# Patient Record
Sex: FEMALE | Race: WHITE | NOT HISPANIC OR LATINO
[De-identification: names, ages, dates, MRNs, and addresses within clinical notes are randomized per-mention and may not be internally consistent; named-entity substitution may affect disease eponyms.]

---

## 2021-05-12 PROBLEM — Z00.00 ENCOUNTER FOR PREVENTIVE HEALTH EXAMINATION: Status: ACTIVE | Noted: 2021-05-12

## 2021-05-24 PROBLEM — Z87.19 HISTORY OF SMALL BOWEL OBSTRUCTION: Status: RESOLVED | Noted: 2021-05-24 | Resolved: 2021-05-24

## 2021-05-24 PROBLEM — Z87.2 HISTORY OF ROSACEA: Status: RESOLVED | Noted: 2021-05-24 | Resolved: 2021-05-24

## 2021-05-24 PROBLEM — Z80.3 FAMILY HISTORY OF MALIGNANT NEOPLASM OF BREAST: Status: ACTIVE | Noted: 2021-05-24

## 2021-05-24 PROBLEM — Z86.39 HISTORY OF THYROID DISORDER: Status: RESOLVED | Noted: 2021-05-24 | Resolved: 2021-05-24

## 2021-05-24 PROBLEM — Z87.09 HISTORY OF ASTHMA: Status: RESOLVED | Noted: 2021-05-24 | Resolved: 2021-05-24

## 2021-05-27 ENCOUNTER — APPOINTMENT (OUTPATIENT)
Dept: RADIATION ONCOLOGY | Facility: CLINIC | Age: 61
End: 2021-05-27
Payer: COMMERCIAL

## 2021-05-27 VITALS
BODY MASS INDEX: 25.61 KG/M2 | HEIGHT: 64 IN | SYSTOLIC BLOOD PRESSURE: 148 MMHG | OXYGEN SATURATION: 99 % | TEMPERATURE: 98 F | DIASTOLIC BLOOD PRESSURE: 74 MMHG | HEART RATE: 82 BPM | WEIGHT: 150 LBS | RESPIRATION RATE: 12 BRPM

## 2021-05-27 DIAGNOSIS — Z86.39 PERSONAL HISTORY OF OTHER ENDOCRINE, NUTRITIONAL AND METABOLIC DISEASE: ICD-10-CM

## 2021-05-27 DIAGNOSIS — Z80.8 FAMILY HISTORY OF MALIGNANT NEOPLASM OF OTHER ORGANS OR SYSTEMS: ICD-10-CM

## 2021-05-27 DIAGNOSIS — Z87.09 PERSONAL HISTORY OF OTHER DISEASES OF THE RESPIRATORY SYSTEM: ICD-10-CM

## 2021-05-27 DIAGNOSIS — Z87.19 PERSONAL HISTORY OF OTHER DISEASES OF THE DIGESTIVE SYSTEM: ICD-10-CM

## 2021-05-27 DIAGNOSIS — Z80.3 FAMILY HISTORY OF MALIGNANT NEOPLASM OF BREAST: ICD-10-CM

## 2021-05-27 DIAGNOSIS — Z87.2 PERSONAL HISTORY OF DISEASES OF THE SKIN AND SUBCUTANEOUS TISSUE: ICD-10-CM

## 2021-05-27 PROCEDURE — 99204 OFFICE O/P NEW MOD 45 MIN: CPT

## 2021-05-27 PROCEDURE — 99072 ADDL SUPL MATRL&STAF TM PHE: CPT

## 2021-05-27 NOTE — VITALS
[Maximal Pain Intensity: 0/10] : 0/10 [Least Pain Intensity: 0/10] : 0/10 [90: Able to carry normal activity; minor signs or symptoms of disease.] : 90: Able to carry normal activity; minor signs or symptoms of disease.  [90: Minor restrictions in physically strenous activity.] : 90: Minor restrictions in physically strenuous activity. [ECOG Performance Status: 1 - Restricted in physically strenuous activity but ambulatory and able to carry out work of a light or sedentary nature] : Performance Status: 1 - Restricted in physically strenuous activity but ambulatory and able to carry out work of a light or sedentary nature, e.g., light house work, office work [Date: ____________] : Patient's last distress assessment performed on [unfilled]. [0 - No Distress] : Distress Level: 0

## 2021-05-27 NOTE — PHYSICAL EXAM
[Normal External Genitalia] : normal external genitalia  [Normal] : oriented to person, place and time, the affect was normal, the mood was normal and not anxious [FreeTextEntry1] : No pelvic mass palpable.  No blood on the examining finger [de-identified] : Vagina narrow.  No implants noted on the vaginal wall.  No pelvic mass palpable.  No blood on the examining finger

## 2021-05-27 NOTE — REVIEW OF SYSTEMS
[Anal Pain: Grade 0] : Anal Pain: Grade 0 [Constipation: Grade 0] : Constipation: Grade 0 [Diarrhea: Grade 0] : Diarrhea: Grade 0 [Nausea: Grade 0] : Nausea: Grade 0 [Vomiting: Grade 0] : Vomiting: Grade 0 [Fatigue: Grade 1 - Fatigue relieved by rest] : Fatigue: Grade 1 - Fatigue relieved by rest [Hematuria: Grade 0] : Hematuria: Grade 0 [Urinary Incontinence: Grade 0] : Urinary Incontinence: Grade 0  [Anxiety: Grade 0] : Anxiety: Grade 0  [Depression: Grade 0] : Depression: Grade 0 [Insomnia: Grade 0] : Insomnia: Grade 0

## 2021-05-27 NOTE — HISTORY OF PRESENT ILLNESS
[FreeTextEntry1] : Mrs. Alva Jarvis presents today for recurrent ovarian cancer referred by Dr. Anderson. \par \par Mrs. 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 She had a TAHBSO at that time. Then in 2016 she had a second debulking procedure including a sigmoid resection with reanastomosis and resection of multiple implants in 4/2016. 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  She then completed 6 cycles of Carboplatin and Doxil. Then had a post treatment CT Scan which revealed a question of a calcified left pelvic sidewall mass. She then went to [facility] for another opinion and had a tertiary debulking with Dr. Samaniego. \par \par In 2018 she had a subacute intestinal obstruction, which she was admitted for 2 days for here at [facility]. \par \par 2021 She has been receiving Topotecan infusions. She has been experiencing symptomatic anemia and has been receiving Aranesp and B12 injections. In early May she  experienced an episode of right sided abdominal pain which radiated down to her leg and the buttock. It eventually passed. Her last infusion was 4/2021. She had a blood transfusion on 5/10/2021.\par \par \par On 5/3/2021 CT C/A/P revealed Mesenteric lymphadenopathy measuring 1.4 x 1.0 cm without significant change. Adjacent inferior mesenteric lymph node 5 mm previously 10 mm.  Right internal iliac lymph jaquan mass or implant is difficult to measure as there is contiguous adjacent infiltration versus wall thickening of the adjacent small bowel loop which may result in inaccurate measurements on current and previous exam but more conspicuous involvement of the bowel on current study with small intraluminal air within, measures about 3.6 x 3.3 which includes of the bowel and about 3.4 x 2.1 cm attempting to exclude bowel and previously measured about 3.2 x 3.0 cm. Suspect slight increased size with increased linear contiguous soft tissue thickening. No evidence of proximal bowel dilation or bowel obstruction. Mild presacral haziness is stable. Mild left sided pelvic sidewall fascial thickening with adjacent surgical clips are stable and likely represent post surgical changes. \par \par She denies any pain at this time, she denies any bowel or bladder issues, denies any bleeding. Abdomen is nontender, nondistended, no dependent edema noted. She is eating, sleeping, drinking well.

## 2021-06-22 ENCOUNTER — NON-APPOINTMENT (OUTPATIENT)
Age: 61
End: 2021-06-22

## 2021-06-22 VITALS
TEMPERATURE: 98 F | OXYGEN SATURATION: 98 % | SYSTOLIC BLOOD PRESSURE: 138 MMHG | HEART RATE: 84 BPM | DIASTOLIC BLOOD PRESSURE: 78 MMHG | RESPIRATION RATE: 14 BRPM

## 2021-06-22 NOTE — DISEASE MANAGEMENT
[Pathological] : TNM Stage: p [III] : III [TTNM] : x [NTNM] : x [MTNM] : x [de-identified] : completed 5/30 fractions to a dose of 900 cGy

## 2021-06-22 NOTE — HISTORY OF PRESENT ILLNESS
[FreeTextEntry1] : Mrs. Alva Jarvis presents today for recurrent ovarian cancer referred by Dr. Anderson. \par \par Mrs. 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 She had a TAHBSO at that time. Then in 2016 she had a second debulking procedure including a sigmoid resection with reanastomosis and resection of multiple implants in 4/2016. 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  She then completed 6 cycles of Carboplatin and Doxil. Then had a post treatment CT Scan which revealed a question of a calcified left pelvic sidewall mass. She then went to [facility] for another opinion and had a tertiary debulking with Dr. Samaniego. \par \par In 2018 she had a subacute intestinal obstruction, which she was admitted for 2 days for here at [facility]. \par \par 2021 She has been receiving Topotecan infusions. She has been experiencing symptomatic anemia and has been receiving Aranesp and B12 injections. In early May she  experienced an episode of right sided abdominal pain which radiated down to her leg and the buttock. It eventually passed. Her last infusion was 4/2021. She had a blood transfusion on 5/10/2021.\par \par \par On 5/3/2021 CT C/A/P revealed Mesenteric lymphadenopathy measuring 1.4 x 1.0 cm without significant change. Adjacent inferior mesenteric lymph node 5 mm previously 10 mm.  Right internal iliac lymph jaquan mass or implant is difficult to measure as there is contiguous adjacent infiltration versus wall thickening of the adjacent small bowel loop which may result in inaccurate measurements on current and previous exam but more conspicuous involvement of the bowel on current study with small intraluminal air within, measures about 3.6 x 3.3 which includes of the bowel and about 3.4 x 2.1 cm attempting to exclude bowel and previously measured about 3.2 x 3.0 cm. Suspect slight increased size with increased linear contiguous soft tissue thickening. No evidence of proximal bowel dilation or bowel obstruction. Mild presacral haziness is stable. Mild left sided pelvic sidewall fascial thickening with adjacent surgical clips are stable and likely represent post surgical changes. \par \par She denies any pain at this time, she denies any bowel or bladder issues, denies any bleeding. Abdomen is nontender, nondistended, no dependent edema noted. She is eating, sleeping, drinking well.

## 2021-06-22 NOTE — REVIEW OF SYSTEMS
[Constipation: Grade 0] : Constipation: Grade 0 [Diarrhea: Grade 0] : Diarrhea: Grade 0 [Urinary Urgency: Grade 1 - Present] : Urinary Urgency: Grade 1 - Present [Urinary Frequency: Grade 1 - Present] : Urinary Frequency: Grade 1 - Present [Dermatitis Radiation: Grade 0] : Dermatitis Radiation: Grade 0 [FreeTextEntry6] : Eucerin BID

## 2021-07-09 ENCOUNTER — NON-APPOINTMENT (OUTPATIENT)
Age: 61
End: 2021-07-09

## 2021-07-09 NOTE — DISEASE MANAGEMENT
[Pathological] : TNM Stage: p [III] : III [TTNM] : x [NTNM] : x [MTNM] : x [de-identified] : completed 11 / 30 fractions to a dose of 1980 cGy

## 2021-07-09 NOTE — REVIEW OF SYSTEMS
[Anal Pain: Grade 0] : Anal Pain: Grade 0 [Constipation: Grade 0] : Constipation: Grade 0 [Diarrhea: Grade 0] : Diarrhea: Grade 0 [Dyspepsia: Grade 0] : Dyspepsia: Grade 0 [Dysphagia: Grade 0] : Dysphagia: Grade 0 [Esophagitis: Grade 0] : Esophagitis: Grade 0 [Fecal Incontinence: Grade 0] : Fecal Incontinence: Grade 0 [Gastroparesis: Grade 0] : Gastroparesis: Grade 0 [Nausea: Grade 0] : Nausea: Grade 0 [Proctitis: Grade 0] : Proctitis: Grade 0 [Rectal Pain: Grade 0] : Rectal Pain: Grade 0 [Small Intestinal Obstruction: Grade 0] : Small Intestinal Obstruction: Grade 0 [Vomiting: Grade 0] : Vomiting: Grade 0 [Fatigue: Grade 1 - Fatigue relieved by rest] : Fatigue: Grade 1 - Fatigue relieved by rest [Urinary Urgency: Grade 1 - Present] : Urinary Urgency: Grade 1 - Present [Urinary Frequency: Grade 1 - Present] : Urinary Frequency: Grade 1 - Present [Alopecia: Grade 0] : Alopecia: Grade 0 [Pruritus: Grade 0] : Pruritus: Grade 0 [Skin Atrophy: Grade 0] : Skin Atrophy: Grade 0 [Skin Hyperpigmentation: Grade 0] : Skin Hyperpigmentation: Grade 0 [Dermatitis Radiation: Grade 0] : Dermatitis Radiation: Grade 0 [FreeTextEntry6] : Eucerin BID

## 2021-07-09 NOTE — VITALS
[Maximal Pain Intensity: 0/10] : 0/10 [Least Pain Intensity: 0/10] : 0/10 [90: Able to carry normal activity; minor signs or symptoms of disease.] : 90: Able to carry normal activity; minor signs or symptoms of disease.  [90: Minor restrictions in physically strenous activity.] : 90: Minor restrictions in physically strenuous activity. [ECOG Performance Status: 0 - Fully active, able to carry on all pre-disease performance without restriction] : Performance Status: 0 - Fully active, able to carry on all pre-disease performance without restriction [Date: ____________] : Patient's last distress assessment performed on [unfilled]. [0 - No Distress] : Distress Level: 0

## 2021-07-09 NOTE — HISTORY OF PRESENT ILLNESS
[FreeTextEntry1] : Mrs. Alva Jarvis is a 61 year old female with Locally advanced high-grade papillary serous ovarian cancer, status post surgical resection and systemic chemotherapy with isolated relapse in pelvic region.\par \par She is here for an OTV and has completed 11 / 30 fractions to a dose of 1980 cGy.\par \par

## 2021-07-14 ENCOUNTER — NON-APPOINTMENT (OUTPATIENT)
Age: 61
End: 2021-07-14

## 2021-07-14 VITALS
OXYGEN SATURATION: 99 % | DIASTOLIC BLOOD PRESSURE: 80 MMHG | SYSTOLIC BLOOD PRESSURE: 136 MMHG | HEART RATE: 78 BPM | RESPIRATION RATE: 14 BRPM | TEMPERATURE: 98 F

## 2021-07-15 NOTE — DISEASE MANAGEMENT
[Pathological] : TNM Stage: p [III] : III [TTNM] : x [NTNM] : x [MTNM] : x [de-identified] : completed 15 / 30 fractions to a dose of 2700 cGy

## 2021-07-15 NOTE — HISTORY OF PRESENT ILLNESS
[FreeTextEntry1] : Mrs. Alva Jarvis is a 61 year old female with Locally advanced high-grade papillary serous ovarian cancer, status post surgical resection and systemic chemotherapy with isolated relapse in pelvic region.\par \par She is here for an OTV and notes that she is doing well. She complains of afternoon episodes where she develops chills but they resolve after time.\par \par

## 2021-07-20 ENCOUNTER — NON-APPOINTMENT (OUTPATIENT)
Age: 61
End: 2021-07-20

## 2021-07-20 VITALS
WEIGHT: 147 LBS | DIASTOLIC BLOOD PRESSURE: 64 MMHG | RESPIRATION RATE: 12 BRPM | TEMPERATURE: 98 F | SYSTOLIC BLOOD PRESSURE: 123 MMHG | OXYGEN SATURATION: 98 % | BODY MASS INDEX: 25.1 KG/M2 | HEART RATE: 67 BPM | HEIGHT: 64 IN

## 2021-07-20 NOTE — DISEASE MANAGEMENT
[Pathological] : TNM Stage: p [III] : III [TTNM] : x [NTNM] : x [MTNM] : x [de-identified] : completed 19 / 30 fractions to a dose of 3420 cGy

## 2021-07-20 NOTE — HISTORY OF PRESENT ILLNESS
[FreeTextEntry1] : Mrs. Alva Jarvis is a 61 year old female with Locally advanced high-grade papillary serous ovarian cancer, status post surgical resection and systemic chemotherapy with isolated relapse in pelvic region.\par \par She is here for an OTV and notes that she is doing well. She has no complaints today. She denies pain, bleeding, urinary issues, or bowel issues. \par

## 2021-07-20 NOTE — REVIEW OF SYSTEMS
[Anal Pain: Grade 0] : Anal Pain: Grade 0 [Constipation: Grade 0] : Constipation: Grade 0 [Diarrhea: Grade 0] : Diarrhea: Grade 0 [Dyspepsia: Grade 0] : Dyspepsia: Grade 0 [Esophagitis: Grade 0] : Esophagitis: Grade 0 [Dysphagia: Grade 0] : Dysphagia: Grade 0 [Fecal Incontinence: Grade 0] : Fecal Incontinence: Grade 0 [Gastroparesis: Grade 0] : Gastroparesis: Grade 0 [Nausea: Grade 0] : Nausea: Grade 0 [Proctitis: Grade 0] : Proctitis: Grade 0 [Rectal Pain: Grade 0] : Rectal Pain: Grade 0 [Small Intestinal Obstruction: Grade 0] : Small Intestinal Obstruction: Grade 0 [Vomiting: Grade 0] : Vomiting: Grade 0 [Fatigue: Grade 1 - Fatigue relieved by rest] : Fatigue: Grade 1 - Fatigue relieved by rest [Hematuria: Grade 0] : Hematuria: Grade 0 [Urinary Incontinence: Grade 0] : Urinary Incontinence: Grade 0  [Urinary Retention: Grade 0] : Urinary Retention: Grade 0 [Urinary Tract Pain: Grade 0] : Urinary Tract Pain: Grade 0 [Urinary Urgency: Grade 0] : Urinary Urgency: Grade 0 [Urinary Frequency: Grade 0] : Urinary Frequency: Grade 0 [Alopecia: Grade 0] : Alopecia: Grade 0 [Pruritus: Grade 0] : Pruritus: Grade 0 [Skin Atrophy: Grade 0] : Skin Atrophy: Grade 0 [Skin Hyperpigmentation: Grade 0] : Skin Hyperpigmentation: Grade 0 [Skin Induration: Grade 0] : Skin Induration: Grade 0 [Dermatitis Radiation: Grade 0] : Dermatitis Radiation: Grade 0 [FreeTextEntry6] : Eucerin BID

## 2021-07-27 ENCOUNTER — NON-APPOINTMENT (OUTPATIENT)
Age: 61
End: 2021-07-27

## 2021-07-27 VITALS
TEMPERATURE: 98 F | OXYGEN SATURATION: 98 % | DIASTOLIC BLOOD PRESSURE: 77 MMHG | HEIGHT: 64 IN | BODY MASS INDEX: 24.75 KG/M2 | WEIGHT: 145 LBS | SYSTOLIC BLOOD PRESSURE: 135 MMHG | RESPIRATION RATE: 13 BRPM | HEART RATE: 66 BPM

## 2021-07-27 NOTE — REVIEW OF SYSTEMS
[Anal Pain: Grade 0] : Anal Pain: Grade 0 [Constipation: Grade 0] : Constipation: Grade 0 [Diarrhea: Grade 0] : Diarrhea: Grade 0 [Dyspepsia: Grade 0] : Dyspepsia: Grade 0 [Dysphagia: Grade 0] : Dysphagia: Grade 0 [Esophagitis: Grade 0] : Esophagitis: Grade 0 [Fecal Incontinence: Grade 0] : Fecal Incontinence: Grade 0 [Gastroparesis: Grade 0] : Gastroparesis: Grade 0 [Nausea: Grade 0] : Nausea: Grade 0 [Proctitis: Grade 0] : Proctitis: Grade 0 [Rectal Pain: Grade 0] : Rectal Pain: Grade 0 [Small Intestinal Obstruction: Grade 0] : Small Intestinal Obstruction: Grade 0 [Vomiting: Grade 0] : Vomiting: Grade 0 [Fatigue: Grade 0] : Fatigue: Grade 0 [Hematuria: Grade 0] : Hematuria: Grade 0 [Urinary Incontinence: Grade 0] : Urinary Incontinence: Grade 0  [Urinary Retention: Grade 0] : Urinary Retention: Grade 0 [Urinary Tract Pain: Grade 0] : Urinary Tract Pain: Grade 0 [Urinary Urgency: Grade 0] : Urinary Urgency: Grade 0 [Urinary Frequency: Grade 0] : Urinary Frequency: Grade 0 [Alopecia: Grade 0] : Alopecia: Grade 0 [Pruritus: Grade 0] : Pruritus: Grade 0 [Skin Atrophy: Grade 0] : Skin Atrophy: Grade 0 [Skin Hyperpigmentation: Grade 0] : Skin Hyperpigmentation: Grade 0 [Skin Induration: Grade 0] : Skin Induration: Grade 0 [Dermatitis Radiation: Grade 0] : Dermatitis Radiation: Grade 0 [FreeTextEntry6] : Eucerin BID

## 2021-07-27 NOTE — HISTORY OF PRESENT ILLNESS
[FreeTextEntry1] : Mrs. Alva Jarvis is a 61 year old female with Locally advanced high-grade papillary serous ovarian cancer, status post surgical resection and systemic chemotherapy with isolated relapse in pelvic region.\par \par She is here for an OTV and notes that she is doing well. She has no complaints today. She denies pain, bleeding, urinary issues, or bowel issues. She is using Eucerin cream BID. \par

## 2021-07-27 NOTE — DISEASE MANAGEMENT
[Pathological] : TNM Stage: p [III] : III [TTNM] : x [NTNM] : x [MTNM] : x [de-identified] : completed 23 / 30 fractions to a dose of 4140cGy

## 2021-08-03 ENCOUNTER — NON-APPOINTMENT (OUTPATIENT)
Age: 61
End: 2021-08-03

## 2021-08-03 VITALS
TEMPERATURE: 98 F | WEIGHT: 143 LBS | HEART RATE: 73 BPM | RESPIRATION RATE: 12 BRPM | DIASTOLIC BLOOD PRESSURE: 82 MMHG | BODY MASS INDEX: 24.41 KG/M2 | HEIGHT: 64 IN | SYSTOLIC BLOOD PRESSURE: 148 MMHG | OXYGEN SATURATION: 99 %

## 2021-08-03 NOTE — REVIEW OF SYSTEMS
[Anal Pain: Grade 0] : Anal Pain: Grade 0 [Constipation: Grade 0] : Constipation: Grade 0 [Diarrhea: Grade 0] : Diarrhea: Grade 0 [Dyspepsia: Grade 0] : Dyspepsia: Grade 0 [Dysphagia: Grade 0] : Dysphagia: Grade 0 [Esophagitis: Grade 0] : Esophagitis: Grade 0 [Fecal Incontinence: Grade 0] : Fecal Incontinence: Grade 0 [Gastroparesis: Grade 0] : Gastroparesis: Grade 0 [Nausea: Grade 0] : Nausea: Grade 0 [Proctitis: Grade 0] : Proctitis: Grade 0 [Rectal Pain: Grade 0] : Rectal Pain: Grade 0 [Small Intestinal Obstruction: Grade 0] : Small Intestinal Obstruction: Grade 0 [Vomiting: Grade 0] : Vomiting: Grade 0 [Fatigue: Grade 0] : Fatigue: Grade 0 [Hematuria: Grade 0] : Hematuria: Grade 0 [Urinary Incontinence: Grade 0] : Urinary Incontinence: Grade 0  [Urinary Retention: Grade 0] : Urinary Retention: Grade 0 [Urinary Tract Pain: Grade 0] : Urinary Tract Pain: Grade 0 [Urinary Urgency: Grade 0] : Urinary Urgency: Grade 0 [Urinary Frequency: Grade 0] : Urinary Frequency: Grade 0 [Breast Pain: Grade 0] : Breast Pain: Grade 0 [Alopecia: Grade 0] : Alopecia: Grade 0 [Pruritus: Grade 0] : Pruritus: Grade 0 [Skin Atrophy: Grade 0] : Skin Atrophy: Grade 0 [Skin Hyperpigmentation: Grade 0] : Skin Hyperpigmentation: Grade 0 [Skin Induration: Grade 0] : Skin Induration: Grade 0 [Dermatitis Radiation: Grade 0] : Dermatitis Radiation: Grade 0 [FreeTextEntry6] : Eucerin BID

## 2021-08-03 NOTE — HISTORY OF PRESENT ILLNESS
[FreeTextEntry1] : Mrs. Alva Jarvis is a 61 year old female with Locally advanced high-grade papillary serous ovarian cancer, status post surgical resection and systemic chemotherapy with isolated relapse in pelvic region.\par \par She is here for an OTV and has completed 28 / 30 fractions to a dose of 5040cGy She has no complaints today. She denies pain, bleeding, urinary issues, or bowel issues. She is using Eucerin cream BID. \par

## 2021-08-03 NOTE — DISEASE MANAGEMENT
[Pathological] : TNM Stage: p [III] : III [TTNM] : x [NTNM] : x [MTNM] : x [de-identified] : completed 28 / 30 fractions to a dose of 5040cGy

## 2021-09-13 ENCOUNTER — APPOINTMENT (OUTPATIENT)
Dept: RADIATION ONCOLOGY | Facility: CLINIC | Age: 61
End: 2021-09-13
Payer: COMMERCIAL

## 2021-09-13 VITALS
DIASTOLIC BLOOD PRESSURE: 69 MMHG | RESPIRATION RATE: 12 BRPM | SYSTOLIC BLOOD PRESSURE: 121 MMHG | BODY MASS INDEX: 23.56 KG/M2 | TEMPERATURE: 98.7 F | HEIGHT: 64 IN | OXYGEN SATURATION: 97 % | WEIGHT: 138 LBS | HEART RATE: 67 BPM

## 2021-09-13 PROCEDURE — 99024 POSTOP FOLLOW-UP VISIT: CPT

## 2021-09-13 NOTE — VITALS
[Maximal Pain Intensity: 2/10] : 2/10 [Pain Duration: ___] : Pain duration: [unfilled] [Pain Location: ___] : Pain Location: [unfilled] [NSAID/Non-Opioid] : NSAID/Non-Opioid [80: Normal activity with effort; some signs or symptoms of disease.] : 80: Normal activity with effort; some signs or symptoms of disease.  [ECOG Performance Status: 1 - Restricted in physically strenuous activity but ambulatory and able to carry out work of a light or sedentary nature] : Performance Status: 1 - Restricted in physically strenuous activity but ambulatory and able to carry out work of a light or sedentary nature, e.g., light house work, office work

## 2021-09-13 NOTE — HISTORY OF PRESENT ILLNESS
[FreeTextEntry1] : Mrs. Alva Jarvis presents today for recurrent ovarian cancer referred by Dr. Anderson. \par \par Mrs. 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 She had a TAHBSO at that time. Then in 2016 she had a second debulking procedure including a sigmoid resection with reanastomosis and resection of multiple implants in 4/2016. 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 She then completed 6 cycles of Carboplatin and Doxil. Then had a post treatment CT Scan which revealed a question of a calcified left pelvic sidewall mass. She then went [place] [facility] [place] and had a tertiary debulking with Dr. Samaniego. \par \par In 2018 she had a subacute intestinal obstruction, which she was admitted for 2 days for here at [facility]. \par \par 2021 She has been receiving Topotecan infusions. She has been experiencing symptomatic anemia and has been receiving Aranesp and B12 injections. In early May she experienced an episode of right sided abdominal pain which radiated down to her leg and the buttock. It eventually passed. Her last infusion was 4/2021. She had a blood transfusion on 5/10/2021.\par \par On 5/3/2021 CT C/A/P revealed Mesenteric lymphadenopathy measuring 1.4 x 1.0 cm without significant change. Adjacent inferior mesenteric lymph node 5 mm previously 10 mm. Right internal iliac lymph jaquan mass or implant is difficult to measure as there is contiguous adjacent infiltration versus wall thickening of the adjacent small bowel loop which may result in inaccurate measurements on current and previous exam but more conspicuous involvement of the bowel on current study with small intraluminal air within, measures about 3.6 x 3.3 which includes of the bowel and about 3.4 x 2.1 cm attempting to exclude bowel and previously measured about 3.2 x 3.0 cm. Suspect slight increased size with increased linear contiguous soft tissue thickening. No evidence of proximal bowel dilation or bowel obstruction. Mild presacral haziness is stable. Mild left sided pelvic sidewall fascial thickening with adjacent surgical clips are stable and likely represent post surgical changes. \par \par \par 9/13/21\par She is here for a PTE after completing 30 of 30 fractions to a dose of 5400cGy on 8/5/21. She states that overall she is feeling well. She has a good appetite. She had a PET/CT ([facility]) on 9/7/2021 that revealed hypermetabolic left lower neck retroclavicular lymphadenopathy new. Right posterior inferior deep chest wall region mass new. Lobulated small bowel mesenteric lesion new since previous PET/CT and ill defined posterior intraperitoneal lesion with suggested engulfment of a small bowel loop increased since previous PET/CT \par She began chemotherapy with Topotecan today. She does have pain that radiates around the back.

## 2021-09-13 NOTE — DISEASE MANAGEMENT
[Pathological] : TNM Stage: p [III] : III [TTNM] : x [NTNM] : x [MTNM] : x [de-identified] : completed 30 / 30 fractions to a dose of 5400cGy on 8/5/21

## 2021-10-12 ENCOUNTER — APPOINTMENT (OUTPATIENT)
Dept: RADIATION ONCOLOGY | Facility: CLINIC | Age: 61
End: 2021-10-12
Payer: COMMERCIAL

## 2021-10-12 VITALS
BODY MASS INDEX: 22.36 KG/M2 | DIASTOLIC BLOOD PRESSURE: 62 MMHG | OXYGEN SATURATION: 100 % | SYSTOLIC BLOOD PRESSURE: 103 MMHG | HEART RATE: 90 BPM | RESPIRATION RATE: 14 BRPM | WEIGHT: 131 LBS | HEIGHT: 64 IN | TEMPERATURE: 99.3 F

## 2021-10-12 VITALS — SYSTOLIC BLOOD PRESSURE: 103 MMHG | DIASTOLIC BLOOD PRESSURE: 62 MMHG

## 2021-10-12 PROCEDURE — 99213 OFFICE O/P EST LOW 20 MIN: CPT | Mod: 25

## 2021-10-12 NOTE — HISTORY OF PRESENT ILLNESS
[FreeTextEntry1] : Mrs. Alva Jarvis presents today for recurrent ovarian cancer referred by Dr. Anderson. \par \par Mrs. 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 She had a TAHBSO at that time. Then in 2016 she had a second debulking procedure including a sigmoid resection with reanastomosis and resection of multiple implants in 4/2016. 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 She then completed 6 cycles of Carboplatin and Doxil. Then had a post treatment CT Scan which revealed a question of a calcified left pelvic sidewall mass. She then went to [facility] [place] and had a tertiary debulking with Dr. Samaniego. \par \par In 2018 she had a subacute intestinal obstruction, which she was admitted for 2 days for here at [facility]. \par \par 2021 She has been receiving Topotecan infusions. She has been experiencing symptomatic anemia and has been receiving Aranesp and B12 injections. In early May she experienced an episode of right sided abdominal pain which radiated down to her leg and the buttock. It eventually passed. Her last infusion was 4/2021. She had a blood transfusion on 5/10/2021.\par \par On 5/3/2021 CT C/A/P revealed Mesenteric lymphadenopathy measuring 1.4 x 1.0 cm without significant change. Adjacent inferior mesenteric lymph node 5 mm previously 10 mm. Right internal iliac lymph jaquan mass or implant is difficult to measure as there is contiguous adjacent infiltration versus wall thickening of the adjacent small bowel loop which may result in inaccurate measurements on current and previous exam but more conspicuous involvement of the bowel on current study with small intraluminal air within, measures about 3.6 x 3.3 which includes of the bowel and about 3.4 x 2.1 cm attempting to exclude bowel and previously measured about 3.2 x 3.0 cm. Suspect slight increased size with increased linear contiguous soft tissue thickening. No evidence of proximal bowel dilation or bowel obstruction. Mild presacral haziness is stable. Mild left sided pelvic sidewall fascial thickening with adjacent surgical clips are stable and likely represent post surgical changes. \par \par \par 9/13/21\par She is here for a PTE after completing 30 of 30 fractions to a dose of 5400cGy on 8/5/21. She states that overall she is feeling well. She has a good appetite. She had a PET/CT ([facility]) on 9/7/2021 that revealed hypermetabolic left lower neck retroclavicular lymphadenopathy new. Right posterior inferior deep chest wall region mass new. Lobulated small bowel mesenteric lesion new since previous PET/CT and ill defined posterior intraperitoneal lesion with suggested engulfment of a small bowel loop increased since previous PET/CT \par She began chemotherapy with Topotecan today. She does have pain that radiates around the back. \par \par 10/12/21\par She is here for a follow up today. She was last here on 9/13/21 for her Metastatic ovarian cancer, status post chemotherapy and radiation therapy to a right pelvic mass. At that time the patient had good local control but has evidence of progression of disease elsewhere. Patient has just started systemic chemotherapy on 9/28/21. At the last visit we stated we would consider a short course of radiation therapy to her right posterior 12th rib lesion if it continues to be symptomatic in spite of systemic chemotherapy. \par \par On 8/26/21 she had a CT of the CAP at [facility] revealing right chest wall soft tissue nodule with calcifications possibly an osseous lesion involving the 12th rib. Further evaluation with bone scan or PET/CT is recommended. Decreases conspicuity of a right internal iliac jaquan mass, which now measures less than 2cm. Minimally increased size of a 1.6 cm mesenteric lymph node. No other enlarging lymph nodes. \par \par On 9/7/21 she had a PET scan at [facility] revealing findings consistent with metastatic ovarian cancer. Hypermetabolic left lower deep chest wall region mass new since prior PET CT. Lobulated small bowel mesenteric lesion new since previous PET CT and ill-defined posterior intraperitoneal lesion with suggested engulfment of a small bowel loop increased since previous scan. \par \par Patient notes intermittent pain in her right lower rib cage posteriorly.  She denies any numbness or weakness in her lower extremities

## 2021-10-12 NOTE — DISEASE MANAGEMENT
[Pathological] : TNM Stage: p [III] : III [TTNM] : x [NTNM] : x [MTNM] : x [de-identified] : completed 30 / 30 fractions to a dose of 5400cGy on 8/5/21

## 2021-10-27 ENCOUNTER — NON-APPOINTMENT (OUTPATIENT)
Age: 61
End: 2021-10-27

## 2021-10-27 VITALS
OXYGEN SATURATION: 100 % | DIASTOLIC BLOOD PRESSURE: 68 MMHG | TEMPERATURE: 97.6 F | HEART RATE: 94 BPM | SYSTOLIC BLOOD PRESSURE: 100 MMHG | RESPIRATION RATE: 16 BRPM

## 2021-10-28 NOTE — DISEASE MANAGEMENT
[Pathological] : TNM Stage: p [TTNM] : x [NTNM] : x [MTNM] : x [III] : III [de-identified] : completed 30 / 30 fractions to a dose of 5400cGy on 8/5/21

## 2021-10-28 NOTE — HISTORY OF PRESENT ILLNESS
[FreeTextEntry1] : 10/27/2021\par Mrs. akins presents today for OTV, completed 5/5 fractions to the right rib to a dose of 1600 cGy. She is still experiencing pain in the right lower back and down the right leg. She is currently on the Fentanyl patch 25 mg and does take Tylenol in addition when she needs it. She is going for chemotherapy today. She will be getting a repeat scan in 2 weeks as pr Dr. Anderson.

## 2021-12-01 ENCOUNTER — APPOINTMENT (OUTPATIENT)
Dept: SURGERY | Facility: CLINIC | Age: 61
End: 2021-12-01

## 2022-09-11 ENCOUNTER — RESULT REVIEW (OUTPATIENT)
Age: 62
End: 2022-09-11

## 2022-09-11 ENCOUNTER — TRANSCRIPTION ENCOUNTER (OUTPATIENT)
Age: 62
End: 2022-09-11

## 2023-01-04 ENCOUNTER — RESULT REVIEW (OUTPATIENT)
Age: 63
End: 2023-01-04

## 2023-01-06 ENCOUNTER — TRANSCRIPTION ENCOUNTER (OUTPATIENT)
Age: 63
End: 2023-01-06

## 2023-01-09 ENCOUNTER — TRANSCRIPTION ENCOUNTER (OUTPATIENT)
Age: 63
End: 2023-01-09

## 2023-05-12 ENCOUNTER — NON-APPOINTMENT (OUTPATIENT)
Age: 63
End: 2023-05-12

## 2023-05-17 ENCOUNTER — APPOINTMENT (OUTPATIENT)
Dept: GERIATRICS | Facility: CLINIC | Age: 63
End: 2023-05-17
Payer: MEDICARE

## 2023-05-17 VITALS
DIASTOLIC BLOOD PRESSURE: 71 MMHG | TEMPERATURE: 98.9 F | RESPIRATION RATE: 16 BRPM | OXYGEN SATURATION: 98 % | BODY MASS INDEX: 18.11 KG/M2 | HEIGHT: 64 IN | SYSTOLIC BLOOD PRESSURE: 115 MMHG | WEIGHT: 106.06 LBS | HEART RATE: 74 BPM

## 2023-05-17 DIAGNOSIS — E78.5 HYPERLIPIDEMIA, UNSPECIFIED: ICD-10-CM

## 2023-05-17 DIAGNOSIS — E53.8 DEFICIENCY OF OTHER SPECIFIED B GROUP VITAMINS: ICD-10-CM

## 2023-05-17 DIAGNOSIS — E03.9 HYPOTHYROIDISM, UNSPECIFIED: ICD-10-CM

## 2023-05-17 DIAGNOSIS — I20.1 ANGINA PECTORIS WITH DOCUMENTED SPASM: ICD-10-CM

## 2023-05-17 PROCEDURE — 99205 OFFICE O/P NEW HI 60 MIN: CPT

## 2023-05-17 RX ORDER — ASPIRIN 81 MG/1
81 TABLET, COATED ORAL
Refills: 0 | Status: ACTIVE | COMMUNITY
Start: 2023-05-17

## 2023-05-17 RX ORDER — CYANOCOBALAMIN (VITAMIN B-12) 1000MCG/ML
1000 VIAL (ML) INJECTION
Refills: 0 | Status: ACTIVE | COMMUNITY

## 2023-05-17 RX ORDER — METOPROLOL TARTRATE 25 MG/1
25 TABLET, FILM COATED ORAL
Refills: 0 | Status: ACTIVE | COMMUNITY

## 2023-05-17 RX ORDER — MENTHOL/CAMPHOR 0.5 %-0.5%
1000 LOTION (ML) TOPICAL
Refills: 0 | Status: DISCONTINUED | COMMUNITY
End: 2023-05-17

## 2023-05-17 RX ORDER — CHROMIUM 200 MCG
TABLET ORAL
Refills: 0 | Status: DISCONTINUED | COMMUNITY
End: 2023-05-17

## 2023-05-17 RX ORDER — DARBEPOETIN ALFA IN ALBUMN SOL 150MCG/0.3
SYRINGE (ML) INJECTION
Refills: 0 | Status: DISCONTINUED | COMMUNITY
End: 2023-05-17

## 2023-05-17 RX ORDER — ROSUVASTATIN CALCIUM 20 MG/1
20 TABLET, FILM COATED ORAL
Refills: 0 | Status: ACTIVE | COMMUNITY

## 2023-05-18 NOTE — HISTORY OF PRESENT ILLNESS
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  Currently on Amox since 1/23, and Keytruda + cyclophosphamide\par \par MRI 01/23 --> 1. Extensive infiltrative tumor involving portions of the L5, S1, and S2 \par vertebral levels as well as extending into the L5-S1 disc space, and extending\par along the presacral soft tissues. Recommend oncology consultation. \par 2. Anterior epidural extension of tumor from L5 through S2. \par 3. Mild prominence of several partially visualized bowel loops, possibly \par reflecting obstruction secondary to distal adhesions near regions of pelvic \par tumor. Recommend GI consultation and correlation with CT abdomen pelvis.\par \par CT 01/23 --> RETROPERITONEUM/LYMPH NODES: Presacral mass eroding into the L5-S1 vertebral\par bodies and intervertebral disc space, not significantly changed from 9/10.  \par ABDOMINAL WALL: Subcutaneous edema in the right proximal thigh. Postsurgical\par changes midline ventral wall.\par BONES: Presacral mass eroding into the L5-S1 vertebral bodies and\par intervertebral disc space, not significantly changed from 9/10.\par \par 1/3-1/6: Back pain radiating down leg. MRI showed extensive infiltrative tumor involving portions of the L5, S1, and S2 vertebral levels as well as extending into the L5-S1 disc space, and extending along the presacral soft tissues, s/p biopsy + culture notable for staph + candida\par \par 5/17 (initial eval): She is accompanied by her , Armani. Reports following symptoms:\par \par General: Fatigue. Her biggest complaint is her QOL- "my bed is my best friend." Needs to rest frequently. Wishes she could be more acting and independent (shopping, driving, volunteering, going to [employer], etc). \par \par GI: + Constipation, 1 tab at night, 1 colace BID, and Miralax QD and with that having small hard BM\par \par Appetite: Poor, eats frequent snacks and losing weight. \par \par Pain: Pain in R leg from knee down. Describes it as being very uncomfortable. Not sharp burning. It is constant. \par Takes 1 Dilaudid twice a day and it helps somewhat but takes a while to kick in. \par \par Sleeping: Sleeps OK, not usually an issue. Occasionally has a bad night. \par \par GOC: Reports that she wants to fight and is not interested in hospice care at this time. Reports not wanting to know how much time she has or the severity of her cancer. 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 Has social support via [employer] in [place]. Son is ER tech, his fiancee is a surgical RN at [employer]. Other son is special  in [employer] [facility] [employer]\par \par Two sons (Alexis, 29, and Brandon 27). \par 5 siblings\par Raffali- ID\par Gold- Oncology\par [facility] [place]\par \par \par \par

## 2023-05-18 NOTE — ASSESSMENT
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 PPS 50%\par \par # Cancer related pain\par - C/w Fentanyl patch @ 25mcg/hr- just increased a few days ago and has helped a bit\par - C/w Dilaudid PRN\par - Narcan ordered\par - Start Cymbalta @ 30mg daily, will likely increase to 60mg\par \par # Anorexia, Pain\par - Certified for cannabis, suggest CBD dominant formula given history of anxiety/hyperactivity\par \par # Constipation\par - Increase nightly Senna to 2 tabs PO QHS, can increase to 2 tabs BID if needed\par \par RTC in 2 weeks or PRN sooner to check in on pain management

## 2023-06-14 ENCOUNTER — APPOINTMENT (OUTPATIENT)
Dept: GERIATRICS | Facility: CLINIC | Age: 63
End: 2023-06-14
Payer: MEDICARE

## 2023-06-14 PROCEDURE — 99214 OFFICE O/P EST MOD 30 MIN: CPT | Mod: 95

## 2023-06-14 NOTE — ASSESSMENT
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 PPS 50%\par \par # Cancer related pain\par - C/w Fentanyl patch @ 25mcg/hr\par - C/w Tramadol PRN or Dilaudid when it comes in\par - Narcan ordered\par - C/w Cymbalta @ 30mg daily- will keep at current dose for now\par \par # Anorexia, Pain\par - Certified for cannabis- advised oral intake not SL 2/2 gag\par \par # Constipation\par - Increased lactulose\par - Suppository today\par \par RTC in 1 weeks or PRN sooner

## 2023-06-14 NOTE — PHYSICAL EXAM
[Sclera] : the sclera and conjunctiva were normal [Normal Oral Mucosa] : normal oral mucosa [Edema] : there was no peripheral edema [Skin Color & Pigmentation] : normal skin color and pigmentation [Skin Turgor] : normal skin turgor [Oriented To Time, Place, And Person] : oriented to person, place, and time [Affect] : the affect was normal [Mood] : the mood was normal [FreeTextEntry1] : Frail,

## 2023-06-14 NOTE — HISTORY OF PRESENT ILLNESS
[Home] : at home, [unfilled] , at the time of the visit. [Medical Office: (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  Currently on Amox since 1/23, and Keytruda + cyclophosphamide\par \par MRI 01/23 --> 1. Extensive infiltrative tumor involving portions of the L5, S1, and S2 \par vertebral levels as well as extending into the L5-S1 disc space, and extending\par along the presacral soft tissues. Recommend oncology consultation. \par 2. Anterior epidural extension of tumor from L5 through S2. \par 3. Mild prominence of several partially visualized bowel loops, possibly \par reflecting obstruction secondary to distal adhesions near regions of pelvic \par tumor. Recommend GI consultation and correlation with CT abdomen pelvis.\par \par CT 01/23 --> RETROPERITONEUM/LYMPH NODES: Presacral mass eroding into the L5-S1 vertebral\par bodies and intervertebral disc space, not significantly changed from 9/10.  \par ABDOMINAL WALL: Subcutaneous edema in the right proximal thigh. Postsurgical\par changes midline ventral wall.\par BONES: Presacral mass eroding into the L5-S1 vertebral bodies and\par intervertebral disc space, not significantly changed from 9/10.\par \par 1/3-1/6: Back pain radiating down leg. MRI showed extensive infiltrative tumor involving portions of the L5, S1, and S2 vertebral levels as well as extending into the L5-S1 disc space, and extending along the presacral soft tissues, s/p biopsy + culture notable for staph + candida\par \par 5/17 (initial eval): She is accompanied by her , Armani. Reports following symptoms. Her biggest complaint is her QOL- "my bed is my best friend." Needs to rest frequently. Wishes she could be more acting and independent (shopping, driving, volunteering, going to [employer], etc).  + Constipation, 1 tab at night, 1 colace BID, and Miralax QD and with that having small hard BM. Appetite poor, eats frequent snacks and losing weight. Pain in R leg from knee down. Describes it as being very uncomfortable. Not sharp burning. It is constant. Takes 1 Dilaudid twice a day and it helps somewhat but takes a while to kick in. Sleeps OK, not usually an issue. Occasionally has a bad night. \par Reports that she wants to fight and is not interested in hospice care at this time. Reports not wanting to know how much time she has or the severity of her cancer. She has had a few family members who were told of their lifespans and then die earlier.  \par RX: Increase nightly senna, certified for cannabis (CBD dominant), start Cymbalta at 30mg daily and c/w Fentanyl patch @ 25mcg/hr\par \par 6/14: Took 15mL of Lactulose twice and had no relief. Strains to have small soft BMs daily. Has severe fas pains. \par Remains on 25mcg Fentanyl patch. Takes 50mg of Tramadol 3x/day. Has not yet tried Dilaudid \par Started Cymbalta. Got nauseous the first day but now takes it with food and is fine. Thinks pain overall is better. \par Taking in protein shakes and is snacking more but is having a hard time with full meals\par Couldn't keep cannabis liquid under her tongue 2/2 gag \par [facility] party is tonight\par \par \par \par Two sons (Alexis, 29, and Brandon 27). \par 5 siblings\par Raffali- ID\par Gold- Oncology\par [facility]\par \par \par \par

## 2023-06-28 ENCOUNTER — APPOINTMENT (OUTPATIENT)
Dept: GERIATRICS | Facility: CLINIC | Age: 63
End: 2023-06-28
Payer: COMMERCIAL

## 2023-06-28 PROCEDURE — 99443: CPT

## 2023-06-28 NOTE — HISTORY OF PRESENT ILLNESS
[Home] : at home, [unfilled] , at the time of the visit. 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  Currently on Amox since 1/23, and Keytruda + cyclophosphamide\par \par MRI 01/23 --> 1. Extensive infiltrative tumor involving portions of the L5, S1, and S2 \par vertebral levels as well as extending into the L5-S1 disc space, and extending\par along the presacral soft tissues. Recommend oncology consultation. \par 2. Anterior epidural extension of tumor from L5 through S2. \par 3. Mild prominence of several partially visualized bowel loops, possibly \par reflecting obstruction secondary to distal adhesions near regions of pelvic \par tumor. Recommend GI consultation and correlation with CT abdomen pelvis.\par \par CT 01/23 --> RETROPERITONEUM/LYMPH NODES: Presacral mass eroding into the L5-S1 vertebral\par bodies and intervertebral disc space, not significantly changed from 9/10.  \par ABDOMINAL WALL: Subcutaneous edema in the right proximal thigh. Postsurgical\par changes midline ventral wall.\par BONES: Presacral mass eroding into the L5-S1 vertebral bodies and\par intervertebral disc space, not significantly changed from 9/10.\par \par 1/3-1/6: Back pain radiating down leg. MRI showed extensive infiltrative tumor involving portions of the L5, S1, and S2 vertebral levels as well as extending into the L5-S1 disc space, and extending along the presacral soft tissues, s/p biopsy + culture notable for staph + candida\par \par 5/17 (initial eval): She is accompanied by her , Armani. Reports following symptoms. Her biggest complaint is her QOL- "my bed is my best friend." Needs to rest frequently. Wishes she could be more acting and independent (shopping, driving, volunteering, going to [employer], etc).  + Constipation, 1 tab at night, 1 colace BID, and Miralax QD and with that having small hard BM. Appetite poor, eats frequent snacks and losing weight. Pain in R leg from knee down. Describes it as being very uncomfortable. Not sharp burning. It is constant. Takes 1 Dilaudid twice a day and it helps somewhat but takes a while to kick in. Sleeps OK, not usually an issue. Occasionally has a bad night. \bryant Reports that she wants to fight and is not interested in hospice care at this time. Reports not wanting to know how much time she has or the severity of her cancer. She has had a few family members who were told of their lifespans and then die earlier.  \par RX: Increase nightly senna, certified for cannabis (CBD dominant), start Cymbalta at 30mg daily and c/w Fentanyl patch @ 25mcg/hr\par \par 6/14: Took 15mL of Lactulose twice and had no relief. Strains to have small soft BMs daily. Has severe fas pains. \bryant Remains on 25mcg Fentanyl patch. Takes 50mg of Tramadol 3x/day. Has not yet tried Dilaudid \par Started Cymbalta. Got nauseous the first day but now takes it with food and is fine. Thinks pain overall is better. \par Taking in protein shakes and is snacking more but is having a hard time with full meals\par Couldn't keep cannabis liquid under her tongue 2/2 gag \par detention party is tonight\par RX: Increase lactulose, c/w cannabis, c/w Tramadol or Dilaudid. Suppository today\par \par 6/28 (f/u, TEB): Had her [facility] party and felt that some people were scared of how "skinny" she has become. \bryant Vomited on the way home. Felt very tired after the party. Son Alexis asking if she is "getting worse" and more people have inquired about the status of her health. Still struggling with weight loss and fatigue. Reiterating that she wants to keep fighting. Was switched to Ertapenem by ID. Does not want a feeding tube but was discussing "nutrition through my IV" with Dr. Anderson. Feels appetite is improved w/ cannabis but she is still losing weight. \par States she hasn't been told to "get my affairs in order" but knows it is coming- but does not want to stop fighting. Reports "I'm scared to death to die"\par \par Pain is much better- worse in AM but overall better. Using 25mcg Fentanyl and Dilaudid a few times a day. Off Tramadol. \par \par Two sons (Alexis, 29, and Brandon 27). \par 5 siblings\par Raffali- ID\par Gold- Oncology\par [facility] [place]\par \par \par \par

## 2023-06-28 NOTE — ASSESSMENT
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 PPS 50%\par \par # Cancer related pain\par - C/w Fentanyl patch @ 25mcg/hr\par - C/w Tramadol PRN or Dilaudid when it comes in\par - Narcan ordered\par - C/w Cymbalta @ 30mg daily- will keep at current dose for now\par \par # Anorexia, Pain, weight loss\par - Not open to feeding tube\par - Unclear if she spoke w/ Dr. Anderson re: ?TPN?- will reach out to discuss\par - Certified for cannabis- advised oral intake not SL 2/2 gag\par \par # Constipation\par - Increased lactulose\par - Add in Senna 2 tabs at night\par - Suppository today\par \par RTC in 1 month or PRN sooner

## 2023-06-28 NOTE — PHYSICAL EXAM
[Oriented To Time, Place, And Person] : oriented to person, place, and time [Affect] : the affect was normal [Mood] : the mood was normal [General Appearance - Alert] : alert

## 2023-07-28 ENCOUNTER — APPOINTMENT (OUTPATIENT)
Dept: GERIATRICS | Facility: CLINIC | Age: 63
End: 2023-07-28
Payer: MEDICARE

## 2023-07-28 DIAGNOSIS — F41.9 ANXIETY DISORDER, UNSPECIFIED: ICD-10-CM

## 2023-07-28 PROCEDURE — 99214 OFFICE O/P EST MOD 30 MIN: CPT | Mod: 95

## 2023-07-28 NOTE — HISTORY OF PRESENT ILLNESS
[Home] : at home, [unfilled] , at the time of the visit. 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  Currently on Amox since 1/23, and Keytruda + cyclophosphamide\par \par MRI 01/23 --> 1. Extensive infiltrative tumor involving portions of the L5, S1, and S2 \par vertebral levels as well as extending into the L5-S1 disc space, and extending\par along the presacral soft tissues. Recommend oncology consultation. \par 2. Anterior epidural extension of tumor from L5 through S2. \par 3. Mild prominence of several partially visualized bowel loops, possibly \par reflecting obstruction secondary to distal adhesions near regions of pelvic \par tumor. Recommend GI consultation and correlation with CT abdomen pelvis.\par \par CT 01/23 --> RETROPERITONEUM/LYMPH NODES: Presacral mass eroding into the L5-S1 vertebral\par bodies and intervertebral disc space, not significantly changed from 9/10.  \par ABDOMINAL WALL: Subcutaneous edema in the right proximal thigh. Postsurgical\par changes midline ventral wall.\par BONES: Presacral mass eroding into the L5-S1 vertebral bodies and\par intervertebral disc space, not significantly changed from 9/10.\par \par 1/3-1/6: Back pain radiating down leg. MRI showed extensive infiltrative tumor involving portions of the L5, S1, and S2 vertebral levels as well as extending into the L5-S1 disc space, and extending along the presacral soft tissues, s/p biopsy + culture notable for staph + candida\par \par 5/17 (initial eval): She is accompanied by her , Armani. Reports following symptoms. Her biggest complaint is her QOL- "my bed is my best friend." Needs to rest frequently. Wishes she could be more acting and independent (shopping, driving, volunteering, going to [employer], etc).  + Constipation, 1 tab at night, 1 colace BID, and Miralax QD and with that having small hard BM. Appetite poor, eats frequent snacks and losing weight. Pain in R leg from knee down. Describes it as being very uncomfortable. Not sharp burning. It is constant. Takes 1 Dilaudid twice a day and it helps somewhat but takes a while to kick in. Sleeps OK, not usually an issue. Occasionally has a bad night. \bryant Reports that she wants to fight and is not interested in hospice care at this time. Reports not wanting to know how much time she has or the severity of her cancer. She has had a few family members who were told of their lifespans and then die earlier.  \par RX: Increase nightly senna, certified for cannabis (CBD dominant), start Cymbalta at 30mg daily and c/w Fentanyl patch @ 25mcg/hr\par \par 6/14: Took 15mL of Lactulose twice and had no relief. Strains to have small soft BMs daily. Has severe fas pains. \bryant Remains on 25mcg Fentanyl patch. Takes 50mg of Tramadol 3x/day. Has not yet tried Dilaudid \par Started Cymbalta. Got nauseous the first day but now takes it with food and is fine. Thinks pain overall is better. \par Taking in protein shakes and is snacking more but is having a hard time with full meals\par Couldn't keep cannabis liquid under her tongue 2/2 gag \par penitentiary party is tonight\par RX: Increase lactulose, c/w cannabis, c/w Tramadol or Dilaudid. Suppository today\par \par 6/28 (f/u, TEB): Had her correction party and felt that some people were scared of how "skinny" she has become. \bryant Vomited on the way home. Felt very tired after the party. Son Alexis asking if she is "getting worse" and more people have inquired about the status of her health. Still struggling with weight loss and fatigue. Reiterating that she wants to keep fighting. Was switched to Ertapenem by ID. Does not want a feeding tube but was discussing "nutrition through my IV" with Dr. Anderson. Feels appetite is improved w/ cannabis but she is still losing weight. \par States she hasn't been told to "get my affairs in order" but knows it is coming- but does not want to stop fighting. Reports "I'm scared to death to die" Pain is much better- worse in AM but overall better. Using 25mcg Fentanyl and Dilaudid a few times a day. Off Tramadol. \par RX: No changes to pain meds. Increased Lactulose and add in Senna 2 tabs PO QHS\par \par 7/28 (f/u): Feeling quite well overall. Remains on Ertapenem. Was supposed to finish it on Monday, but is going to [place] in August for her 30th anniversary, so she is going to remain on it for an additional 2 weeks. Weight has stabilized. Pain is good, taking Dilaudid twice daily. Taking Sennakot 2 tabs nightly, some days holds for diarrhea, mostly going every day or EOD. Has not yet f/u with [facility]- they didn't call her for the appointment, and she has been following up regularly with Radha ([facility] RD)- increased calories, snacking more, Boost supplements, etc). She does have a visit with [facility] 8/7 to evaluate for TPN at night. Using cannabis before dinner-finds it helpful. \par \par Two sons (Alexis, 29, and Brandon 27). \par 5 siblings\par Raffali- ID\par Gold- Oncology\par [facility]\par \par \par \par

## 2023-07-28 NOTE — ASSESSMENT
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 PPS 60%\par \par # Cancer related pain\par - C/w Fentanyl patch @ 25mcg/hr\par - C/w Dilaudid PRN --> mostly taking BID JUANY\par - C/w Cymbalta @ 30mg daily\par \par # Anorexia, Pain, weight loss\par - Spoke to patient re: risks/benefits of TPN- she will discuss further w/ [facility] in her apt 8/7\par - C/w Cannabis- advised to increase to TID\par \par # Constipation\par - Increased lactulose\par - C/w  Senna 2 tabs at night, hold for diarrhea\par \par Next visit 8/23 @ 12PM in person

## 2023-07-28 NOTE — PHYSICAL EXAM
[General Appearance - Alert] : alert [Oriented To Time, Place, And Person] : oriented to person, place, and time [Affect] : the affect was normal [Mood] : the mood was normal

## 2023-08-23 ENCOUNTER — APPOINTMENT (OUTPATIENT)
Dept: GERIATRICS | Facility: CLINIC | Age: 63
End: 2023-08-23
Payer: MEDICARE

## 2023-08-23 VITALS
TEMPERATURE: 97 F | HEART RATE: 97 BPM | OXYGEN SATURATION: 98 % | BODY MASS INDEX: 17.51 KG/M2 | WEIGHT: 102 LBS | SYSTOLIC BLOOD PRESSURE: 104 MMHG | DIASTOLIC BLOOD PRESSURE: 68 MMHG

## 2023-08-23 PROCEDURE — 99215 OFFICE O/P EST HI 40 MIN: CPT

## 2023-08-23 RX ORDER — AMOXICILLIN AND CLAVULANATE POTASSIUM 875; 125 MG/1; MG/1
875-125 TABLET, COATED ORAL
Refills: 0 | Status: DISCONTINUED | COMMUNITY
Start: 2023-05-17 | End: 2023-08-23

## 2023-08-23 RX ORDER — CYCLOPHOSPHAMIDE 50 MG/1
CAPSULE ORAL
Refills: 0 | Status: ACTIVE | COMMUNITY

## 2023-08-23 RX ORDER — LEVOTHYROXINE SODIUM 0.1 MG/1
100 TABLET ORAL
Refills: 0 | Status: DISCONTINUED | COMMUNITY
End: 2023-08-23

## 2023-08-23 RX ORDER — LEVOTHYROXINE SODIUM 0.09 MG/1
88 TABLET ORAL
Refills: 0 | Status: ACTIVE | COMMUNITY

## 2023-08-23 NOTE — PHYSICAL EXAM
[General Appearance - Alert] : alert [Oriented To Time, Place, And Person] : oriented to person, place, and time [Affect] : the affect was normal [Mood] : the mood was normal [Normal Oral Mucosa] : normal oral mucosa [] : no respiratory distress [Heart Sounds] : normal S1 and S2 [FreeTextEntry1] : + LE edema, chronic

## 2023-08-23 NOTE — HISTORY OF PRESENT ILLNESS
[Home] : at home, [unfilled] , at the time of the visit. 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  Currently on Amox since 1/23, and Keytruda + cyclophosphamide  MRI 01/23 --> 1. Extensive infiltrative tumor involving portions of the L5, S1, and S2  vertebral levels as well as extending into the L5-S1 disc space, and extending along the presacral soft tissues. Recommend oncology consultation.  2. Anterior epidural extension of tumor from L5 through S2.  3. Mild prominence of several partially visualized bowel loops, possibly  reflecting obstruction secondary to distal adhesions near regions of pelvic  tumor. Recommend GI consultation and correlation with CT abdomen pelvis.  CT 01/23 --> RETROPERITONEUM/LYMPH NODES: Presacral mass eroding into the L5-S1 vertebral bodies and intervertebral disc space, not significantly changed from 9/10.   ABDOMINAL WALL: Subcutaneous edema in the right proximal thigh. Postsurgical changes midline ventral wall. BONES: Presacral mass eroding into the L5-S1 vertebral bodies and intervertebral disc space, not significantly changed from 9/10.  1/3-1/6: Back pain radiating down leg. MRI showed extensive infiltrative tumor involving portions of the L5, S1, and S2 vertebral levels as well as extending into the L5-S1 disc space, and extending along the presacral soft tissues, s/p biopsy + culture notable for staph + candida  5/17 (initial eval): She is accompanied by her , Armani. Reports following symptoms. Her biggest complaint is her QOL- "my bed is my best friend." Needs to rest frequently. Wishes she could be more acting and independent (shopping, driving, volunteering, going to [employer], etc).  + Constipation, 1 tab at night, 1 colace BID, and Miralax QD and with that having small hard BM. Appetite poor, eats frequent snacks and losing weight. Pain in R leg from knee down. Describes it as being very uncomfortable. Not sharp burning. It is constant. Takes 1 Dilaudid twice a day and it helps somewhat but takes a while to kick in. Sleeps OK, not usually an issue. Occasionally has a bad night.  Reports that she wants to fight and is not interested in hospice care at this time. Reports not wanting to know how much time she has or the severity of her cancer. She has had a few family members who were told of their lifespans and then die earlier.   RX: Increase nightly senna, certified for cannabis (CBD dominant), start Cymbalta at 30mg daily and c/w Fentanyl patch @ 25mcg/hr  6/14: Took 15mL of Lactulose twice and had no relief. Strains to have small soft BMs daily. Has severe fas pains.  Remains on 25mcg Fentanyl patch. Takes 50mg of Tramadol 3x/day. Has not yet tried Dilaudid  Started Cymbalta. Got nauseous the first day but now takes it with food and is fine. Thinks pain overall is better.  Taking in protein shakes and is snacking more but is having a hard time with full meals Couldn't keep cannabis liquid under her tongue 2/2 gag  assisted party is tonight RX: Increase lactulose, c/w cannabis, c/w Tramadol or Dilaudid. Suppository today  6/28 (f/u, TEB): Had her senior living party and felt that some people were scared of how "skinny" she has become.  Vomited on the way home. Felt very tired after the party. Son Alexis asking if she is "getting worse" and more people have inquired about the status of her health. Still struggling with weight loss and fatigue. Reiterating that she wants to keep fighting. Was switched to Ertapenem by ID. Does not want a feeding tube but was discussing "nutrition through my IV" with Dr. Anderson. Feels appetite is improved w/ cannabis but she is still losing weight.  States she hasn't been told to "get my affairs in order" but knows it is coming- but does not want to stop fighting. Reports "I'm scared to death to die" Pain is much better- worse in AM but overall better. Using 25mcg Fentanyl and Dilaudid a few times a day. Off Tramadol.  RX: No changes to pain meds. Increased Lactulose and add in Senna 2 tabs PO QHS  7/28 (f/u): Feeling quite well overall. Remains on Ertapenem. Was supposed to finish it on Monday, but is going to [place] in August for her 30th anniversary, so she is going to remain on it for an additional 2 weeks. Weight has stabilized. Pain is good, taking Dilaudid twice daily. Taking Sennakot 2 tabs nightly, some days holds for diarrhea, mostly going every day or EOD. [facility] [place] [facility]- they didn't call her for the appointment, and she has been following up regularly with Radha ([facility] RD)- increased calories, snacking more, Boost supplements, etc). She does have a visit with [facility] 8/7 to evaluate for TPN at night. Using cannabis before dinner-finds it helpful.  RX: Increase Lactulose, s/w patient re: risks/benefits of TPN. Increase Cannabis to TID. No changes in pain meds.   8/23: Had a great trip to [place]. Used a wheelchair which enabled her to do more, though Alexis () found it difficult to see her like that. Appetite is better. Pain much improved- only taking Dilaudid once/day approx. Having regular BMs though they are hard sometimes. Feels overall better/more energy since she switched her antibiotics. Seeing Dr. Anderson on Friday. Is leaning toward not doing TPN 2/2 fear of risks, and is nervous about a feeding tube "because of what it represents." Reiterated that she is not ready to give up fighting. Also notes that in  the past she has not wanted to know her prognosis, but now does want to know and intends to speak with Dr. Anderson about it tomorrow.. Reports good support group who don't let her get down on herself.   Two sons (Alexis, 29, and Brandon 27).  Alexis () 5 siblings Angela- CHARITO Anderson- Oncology [facility]

## 2023-08-23 NOTE — ASSESSMENT
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 PPS 60%  Spoke at length about her stage cancer and her desire to "fight as long as possible." Reviewed that the feeding tube doesn't only happen at end stages, and doesn't represent any information that she doesn't already know. Given goal of doing everything she can to prolong her life, recommended pursuing a feeding tube if it is clinically indicated by Dr. Anderson.   Re: prognosis- explained that she has quite good performance status despite her disease stage and chronic infections. I suspect she likely has many months but may be prone to complications from her fistula. She/ voiced understanding.   I also encouraged continuing with therapy- I think she is in a new stage of her cancer journey where she is feeling and looking more debilitated, and could benefit from the extra support. Recommended [facility] integrative center which she is already taking part in, and continuing on with her current groups.   # Cancer related pain- no changed today - C/w Fentanyl patch @ 25mcg/hr - C/w Dilaudid PRN --> mostly taking BID JUANY - C/w Cymbalta @ 30mg daily  # Anorexia, Pain, weight loss - C/w Cannabis- advised to increase to TID- she will s/w Vireo as she is currently out - Counseling referrals   # Constipation - C/w lactulose PRN only - C/w  Senna 2 tabs at night, hold for diarrhea  RTC in 6 weeks or PRN sooner

## 2023-09-22 ENCOUNTER — APPOINTMENT (OUTPATIENT)
Dept: GERIATRICS | Facility: CLINIC | Age: 63
End: 2023-09-22
Payer: MEDICARE

## 2023-09-22 PROCEDURE — 99214 OFFICE O/P EST MOD 30 MIN: CPT | Mod: 95

## 2023-10-27 PROBLEM — M62.81 GENERALIZED MUSCLE WEAKNESS: Status: ACTIVE | Noted: 2023-10-24

## 2023-11-03 ENCOUNTER — APPOINTMENT (OUTPATIENT)
Dept: GERIATRICS | Facility: CLINIC | Age: 63
End: 2023-11-03
Payer: MEDICARE

## 2023-11-03 DIAGNOSIS — M62.81 MUSCLE WEAKNESS (GENERALIZED): ICD-10-CM

## 2023-11-10 ENCOUNTER — APPOINTMENT (OUTPATIENT)
Dept: GERIATRICS | Facility: CLINIC | Age: 63
End: 2023-11-10
Payer: MEDICARE

## 2023-11-10 PROCEDURE — 99215 OFFICE O/P EST HI 40 MIN: CPT | Mod: 95

## 2023-11-20 ENCOUNTER — APPOINTMENT (OUTPATIENT)
Dept: GERIATRICS | Facility: CLINIC | Age: 63
End: 2023-11-20
Payer: MEDICARE

## 2023-11-20 PROCEDURE — 99215 OFFICE O/P EST HI 40 MIN: CPT | Mod: 95

## 2023-12-07 RX ORDER — NALOXONE HYDROCHLORIDE 4 MG/.1ML
4 SPRAY NASAL
Qty: 1 | Refills: 0 | Status: ACTIVE | COMMUNITY
Start: 2023-12-07 | End: 1900-01-01

## 2023-12-07 RX ORDER — FENTANYL 12 UG/H
12 PATCH, EXTENDED RELEASE TRANSDERMAL
Qty: 3 | Refills: 0 | Status: ACTIVE | COMMUNITY
Start: 2023-12-07 | End: 1900-01-01

## 2024-01-08 ENCOUNTER — APPOINTMENT (OUTPATIENT)
Dept: GERIATRICS | Facility: CLINIC | Age: 64
End: 2024-01-08
Payer: MEDICARE

## 2024-01-08 PROCEDURE — 99443: CPT | Mod: 93

## 2024-01-08 RX ORDER — LACTULOSE 10 G/15ML
10 SOLUTION ORAL
Qty: 1350 | Refills: 0 | Status: DISCONTINUED | COMMUNITY
Start: 2023-06-14 | End: 2024-01-08

## 2024-01-08 NOTE — PHYSICAL EXAM
[General Appearance - Alert] : alert [Oriented To Time, Place, And Person] : oriented to person, place, and time [Affect] : the affect was normal [Mood] : the mood was normal [FreeTextEntry1] : Frail, cachtic, muscle wasting

## 2024-01-08 NOTE — HISTORY OF PRESENT ILLNESS
[Home] : at home, [unfilled] , at the time of the visit. 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  Currently on Amox since 1/23, and Keytruda + cyclophosphamide  MRI 01/23 --> 1. Extensive infiltrative tumor involving portions of the L5, S1, and S2  vertebral levels as well as extending into the L5-S1 disc space, and extending along the presacral soft tissues. Recommend oncology consultation.  2. Anterior epidural extension of tumor from L5 through S2.  3. Mild prominence of several partially visualized bowel loops, possibly  reflecting obstruction secondary to distal adhesions near regions of pelvic  tumor. Recommend GI consultation and correlation with CT abdomen pelvis.  CT 01/23 --> RETROPERITONEUM/LYMPH NODES: Presacral mass eroding into the L5-S1 vertebral bodies and intervertebral disc space, not significantly changed from 9/10.   ABDOMINAL WALL: Subcutaneous edema in the right proximal thigh. Postsurgical changes midline ventral wall. BONES: Presacral mass eroding into the L5-S1 vertebral bodies and intervertebral disc space, not significantly changed from 9/10.  1/3-1/6: Back pain radiating down leg. MRI showed extensive infiltrative tumor involving portions of the L5, S1, and S2 vertebral levels as well as extending into the L5-S1 disc space, and extending along the presacral soft tissues, s/p biopsy + culture notable for staph + candida  5/17 (initial eval): She is accompanied by her , Armani. Reports following symptoms. Her biggest complaint is her QOL- "my bed is my best friend." Needs to rest frequently. Wishes she could be more acting and independent (shopping, driving, volunteering, going to [employer], etc).  + Constipation, 1 tab at night, 1 colace BID, and Miralax QD and with that having small hard BM. Appetite poor, eats frequent snacks and losing weight. Pain in R leg from knee down. Describes it as being very uncomfortable. Not sharp burning. It is constant. Takes 1 Dilaudid twice a day and it helps somewhat but takes a while to kick in. Sleeps OK, not usually an issue. Occasionally has a bad night.  Reports that she wants to fight and is not interested in hospice care at this time. Reports not wanting to know how much time she has or the severity of her cancer. She has had a few family members who were told of their lifespans and then die earlier.   RX: Increase nightly senna, certified for cannabis (CBD dominant), start Cymbalta at 30mg daily and c/w Fentanyl patch @ 25mcg/hr  6/14: Took 15mL of Lactulose twice and had no relief. Strains to have small soft BMs daily. Has severe gaspains.  Remains on 25mcg Fentanyl patch. Takes 50mg of Tramadol 3x/day. Has not yet tried Dilaudid  Started Cymbalta. Got nauseous the first day but now takes it with food and is fine. Thinks pain overall is better.  Taking in protein shakes and is snacking more but is having a hard time with full meals Couldn't keep cannabis liquid under her tongue 2/2 gag  intermediate party is tonight RX: Increase lactulose, c/w cannabis, c/w Tramadol or Dilaudid. Suppository today  6/28 (f/u, TEB): Had her alf party and felt that some people were scared of how "skinny" she has become.  Vomited on the way home. Felt very tired after the party. Son Alexis asking if she is "getting worse" and more people have inquired about the status of her health. Still struggling with weight loss and fatigue. Reiterating that she wants to keep fighting. Was switched to Ertapenem by ID. Does not want a feeding tube but was discussing "nutrition through my IV" with Dr. Anderson. Feels appetite is improved w/ cannabis but she is still losing weight.  States she hasn't been told to "get my affairs in order" but knows it is coming- but does not want to stop fighting. Reports "I'm scared to death to die" Pain is much better- worse in AM but overall better. Using 25mcg Fentanyl and Dilaudid a few times a day. Off Tramadol.  RX: No changes to pain meds. Increased Lactulose and add in Senna 2 tabs PO QHS  7/28 (f/u): Feeling quite well overall. Remains on Ertapenem. Was supposed to finish it on Monday, but is going to [place] in August for her 30th anniversary, so she is going to remain on it for an additional 2 weeks. Weight has stabilized. Pain is good, taking Dilaudid twice daily. Taking Sennakot 2 tabs nightly, some days holds for diarrhea, mostly going every day or EOD. Has not yet f/u with [facility]- they didn't call her for the appointment, and she has been following up regularly with Radha ([facility] RD)- increased calories, snacking more, Boost supplements, etc). She does have a visit with [facility] 8/7 to evaluate for TPN at night. Using cannabis before dinner-finds it helpful.  RX: Increase Lactulose, s/w patient re: risks/benefits of TPN. Increase Cannabis to TID. No changes in pain meds.   8/23: Had a great trip to [place]. Used a wheelchair which enabled her to do more, though Alexis () found it difficult to see her like that. Appetite is better. Pain much improved- only taking Dilaudid once/day approx. Having regular BMs though they are hard sometimes. Feels overall better/more energy since she switched her antibiotics. Seeing Dr. Anderson on Friday. Is leaning toward not doing TPN 2/2 fear of risks, and is nervous about a feeding tube "because of what it represents." Reiterated that she is not ready to give up fighting. Also notes that in  the past she has not wanted to know her prognosis, but now does want to know and intends to speak with Dr. Anderson about it tomorrow.. Reports good support group who don't let her get down on herself.  RX: No changed in pain meds, increased cannabis to TID  MRI 9/14:   9/22 (f/u, TEB):  Reports CA-125 has increased but recent scans were WNL. Also reports that Oncology team had been considering ?draining her abscess? but there reportedly wasn't enough fluid. Was also reportedly referred to general surgery and neurosurgery though they felt that there wasn't much to be done. Symptom wise, reports feeling "a little tired," weight is stable at 106, appetite is OK, going out more, sleeping well. A little pain in her legs, which is chronic, but "not that much." worse in the AM and then again in the evening.  Not all the time. Does have leg swelling which is also chronic. Seeing RD next Friday. Is still planning on having a "big talk" with her boys re: her prognosis and clinical status. Using Lactulose/Senna PRN  PET 11/1: 7.4cm abscess vs mass in the presacral region that appears to extend to the L5-S1 level  11/8 (note): Per note received from Dr. Anderson's office. patient was presented at tumor board; group agreed that unless patient has diversion and goes on TPN, would not be safe to proceed with further myelosuppressive chemo. Not a candidate for further RT. Plan to continue with PO Cytoxan, Keytruda, and IV ABX.   11/10 (f/u): Pain is mostly controlled and appetite is improving. Biggest complaint is fatigue. Also reports that she was told that she may need a hemicolectomy for a fistula between her small bowel and spine causing her known abscess, and need to be on TPN. She is going to speak to Dr. Anderson more about it next week. Very apprehensive about the surgery and needing to be on TPN but she was told she cannot get further myelosuppressive chemo without it due to the ongoing infection  11/20 (f/u): Now told that she does not need TPN/surgery and can continue with current treatment regimen. Was told to f/u Dr. Guardado as well and is pending making an apt there. Pain is controlled for the most part. She is frustrated by the constant changing of plans but relieved not to have to pursue TPN + surgery  1/8/24: Reports worsening leg pain, present in both but R>L. Feels "heavy." no numbness, no tingling, no sharp/shooting. Stretching helps. Tylenol helps. Still only taking Dilaudid 2-3x/day. Tolerating treating well. Finding it harder to stay active- more fatigued and out of breath. Taking 1 Senna BID + Miralax in AM, having daily BMs but they are hard and she is straining.    Two sons (Alexis, 29, and Brandon 27).  Alexis () 5 siblings Nina Anderson- Oncology [facility] [place]

## 2024-01-08 NOTE — ASSESSMENT
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 PPS 60%.  # Cancer related pain- no changed today - C/w Fentanyl patch @ 25mcg/hr- we discussed increasing to 37mcg - she wants to try adding in additional Dilaudid in the middle of the day - Increase Dilaudid to TID  - C/w Cymbalta @ 30mg daily  # Anorexia, Pain, weight loss - C/w Cannabis - Counseling   # Constipation - Increase Senna to 1 tab in AM, 2 tabs at night, + Miralax QD  # Health Maintenance - Starting PT soon  RTC in 1 month or PRN sooner.

## 2024-01-16 RX ORDER — DULOXETINE HYDROCHLORIDE 30 MG/1
30 CAPSULE, DELAYED RELEASE PELLETS ORAL
Qty: 30 | Refills: 3 | Status: ACTIVE | COMMUNITY
Start: 2023-05-17 | End: 1900-01-01

## 2024-01-25 ENCOUNTER — APPOINTMENT (OUTPATIENT)
Dept: GYNECOLOGIC ONCOLOGY | Facility: CLINIC | Age: 64
End: 2024-01-25

## 2024-01-26 ENCOUNTER — TRANSCRIPTION ENCOUNTER (OUTPATIENT)
Age: 64
End: 2024-01-26

## 2024-02-06 NOTE — HISTORY OF PRESENT ILLNESS
[FreeTextEntry1] :  62yo P  LMP  referred for longstanding recurrent platinum-resistant ovarian cancer currently on cyclophosphamide and Keytruda, course c/b chronic spinal abscess for which she is on ertapenem at home(Dr Patton). She follows with Dr Anderson([facility]) and pall care for pain using fentanyl patch and dilaudid. Her  is uptrending suggesting progression of disease although imaging does not show progression. She had been following with Dr Amena Samaniego at [facility]. She has undergone RT for bone metastasis. denies n/v/fever/bleeding/bloating. Reports normal urination and BMs.   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  Moderate bilateral pleural effusions, no significant hypermetabolism.  Interstitial thickening in the lower lungs. Abdomen/pelvis: Multifocal hepatic hypodensities new from prior with FDG avidity, example left liver series 3 image 77 with SUV max 8.5.  Second example, right liver lesion on series 3 image 73 with SUV max 7.1 Midline lower abdomen omental implant measuring 25 x 17 mm on series 3 image 118 with SUV max 10.3.  A second bilobed metastatic implant right abdomen 29 x 21 mm with SUV max 6.2. Large necrotic mass in the posterior pelvis/presacral space invading bone with air-fluid level, currently SUV max on the periphery measures 23.2, previously 10.1.  Size appears similar roughly 7.5 cm.  Again noted FDG uptake in the adjacent sacrum. Musculoskeletal: Sacral uptake as described above Impression: New FDG avid hepatic masses. FDG avid peritoneal soft tissue nodules suggesting metastatic implant Increased hypermetabolism along the periphery of a posterior pelvic masslike collection which may represent local malignant progression, progression an abscess or a combination.  PET cannot further differentiate. Moderate bilateral pleural effusions with interstitial edema

## 2024-02-07 ENCOUNTER — NON-APPOINTMENT (OUTPATIENT)
Age: 64
End: 2024-02-07

## 2024-02-07 NOTE — ASSESSMENT
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 PPS 60%.  # Cancer related pain- no changed today - C/w Fentanyl patch @ 25mcg/hr- we discussed increasing to 37mcg - she wants to try adding in additional Dilaudid in the middle of the day - Increase Dilaudid to TID  - C/w Cymbalta @ 30mg daily  # Anorexia, Pain, weight loss - C/w Cannabis - Counseling   # Constipation - Increase Senna to 1 tab in AM, 2 tabs at night, + Miralax QD  # Health Maintenance - Starting PT soon  RTC in 1 month or PRN sooner.

## 2024-02-07 NOTE — HISTORY OF PRESENT ILLNESS
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  Currently on Amox since 1/23, and Keytruda + cyclophosphamide  MRI 01/23 --> 1. Extensive infiltrative tumor involving portions of the L5, S1, and S2  vertebral levels as well as extending into the L5-S1 disc space, and extending along the presacral soft tissues. Recommend oncology consultation.  2. Anterior epidural extension of tumor from L5 through S2.  3. Mild prominence of several partially visualized bowel loops, possibly  reflecting obstruction secondary to distal adhesions near regions of pelvic  tumor. Recommend GI consultation and correlation with CT abdomen pelvis.  CT 01/23 --> RETROPERITONEUM/LYMPH NODES: Presacral mass eroding into the L5-S1 vertebral bodies and intervertebral disc space, not significantly changed from 9/10.   ABDOMINAL WALL: Subcutaneous edema in the right proximal thigh. Postsurgical changes midline ventral wall. BONES: Presacral mass eroding into the L5-S1 vertebral bodies and intervertebral disc space, not significantly changed from 9/10.  1/3-1/6: Back pain radiating down leg. MRI showed extensive infiltrative tumor involving portions of the L5, S1, and S2 vertebral levels as well as extending into the L5-S1 disc space, and extending along the presacral soft tissues, s/p biopsy + culture notable for staph + candida  5/17 (initial eval): She is accompanied by her , Armani. Reports following symptoms. Her biggest complaint is her QOL- "my bed is my best friend." Needs to rest frequently. Wishes she could be more acting and independent (shopping, driving, volunteering, going to [employer], etc).  + Constipation, 1 tab at night, 1 colace BID, and Miralax QD and with that having small hard BM. Appetite poor, eats frequent snacks and losing weight. Pain in R leg from knee down. Describes it as being very uncomfortable. Not sharp burning. It is constant. Takes 1 Dilaudid twice a day and it helps somewhat but takes a while to kick in. Sleeps OK, not usually an issue. Occasionally has a bad night.  Reports that she wants to fight and is not interested in hospice care at this time. Reports not wanting to know how much time she has or the severity of her cancer. She has had a few family members who were told of their lifespans and then die earlier.   RX: Increase nightly senna, certified for cannabis (CBD dominant), start Cymbalta at 30mg daily and c/w Fentanyl patch @ 25mcg/hr  6/14: Took 15mL of Lactulose twice and had no relief. Strains to have small soft BMs daily. Has severe gaspains.  Remains on 25mcg Fentanyl patch. Takes 50mg of Tramadol 3x/day. Has not yet tried Dilaudid  Started Cymbalta. Got nauseous the first day but now takes it with food and is fine. Thinks pain overall is better.  Taking in protein shakes and is snacking more but is having a hard time with full meals Couldn't keep cannabis liquid under her tongue 2/2 gag  skilled nursing party is tonight RX: Increase lactulose, c/w cannabis, c/w Tramadol or Dilaudid. Suppository today  6/28 (f/u, TEB): Had her [facility] party and felt that some people were scared of how "skinny" she has become.  Vomited on the way home. Felt very tired after the party. Son Alexis asking if she is "getting worse" and more people have inquired about the status of her health. Still struggling with weight loss and fatigue. Reiterating that she wants to keep fighting. Was switched to Ertapenem by ID. Does not want a feeding tube but was discussing "nutrition through my IV" with Dr. Anderson. Feels appetite is improved w/ cannabis but she is still losing weight.  States she hasn't been told to "get my affairs in order" but knows it is coming- but does not want to stop fighting. Reports "I'm scared to death to die" Pain is much better- worse in AM but overall better. Using 25mcg Fentanyl and Dilaudid a few times a day. Off Tramadol.  RX: No changes to pain meds. Increased Lactulose and add in Senna 2 tabs PO QHS  7/28 (f/u): Feeling quite well overall. Remains on Ertapenem. Was supposed to finish it on Monday, but is going to [place] in August for her 30th anniversary, so she is going to remain on it for an additional 2 weeks. Weight has stabilized. Pain is good, taking Dilaudid twice daily. Taking [place] 2 tabs nightly, some days holds for diarrhea, mostly going every day or EOD. Has not yet f/u with [facility]- they didn't call her for the appointment, and she has been following up regularly with Radha ([facility] RD)- increased calories, snacking more, Boost supplements, etc). She does have a visit with [facility] 8/7 to evaluate for TPN at night. Using cannabis before dinner-finds it helpful.  RX: Increase Lactulose, s/w patient re: risks/benefits of TPN. Increase Cannabis to TID. No changes in pain meds.   8/23: Had a great trip to [place]. Used a wheelchair which enabled her to do more, though Alexis () found it difficult to see her like that. Appetite is better. Pain much improved- only taking Dilaudid once/day approx. Having regular BMs though they are hard sometimes. Feels overall better/more energy since she switched her antibiotics. Seeing Dr. Anderson on Friday. Is leaning toward not doing TPN 2/2 fear of risks, and is nervous about a feeding tube "because of what it represents." Reiterated that she is not ready to give up fighting. Also notes that in  the past she has not wanted to know her prognosis, but now does want to know and intends to speak with Dr. Anderson about it tomorrow.. Reports good support group who don't let her get down on herself.  RX: No changed in pain meds, increased cannabis to TID  MRI 9/14:   9/22 (f/u, TEB):  Reports CA-125 has increased but recent scans were WNL. Also reports that Oncology team had been considering ?draining her abscess? but there reportedly wasn't enough fluid. Was also reportedly referred to general surgery and neurosurgery though they felt that there wasn't much to be done. Symptom wise, reports feeling "a little tired," weight is stable at 106, appetite is OK, going out more, sleeping well. A little pain in her legs, which is chronic, but "not that much." worse in the AM and then again in the evening.  Not all the time. Does have leg swelling which is also chronic. Seeing RD next Friday. Is still planning on having a "big talk" with her boys re: her prognosis and clinical status. Using Lactulose/Senna PRN  PET 11/1: 7.4cm abscess vs mass in the presacral region that appears to extend to the L5-S1 level  11/8 (note): Per note received from Dr. Anderson's office. patient was presented at tumor board; group agreed that unless patient has diversion and goes on TPN, would not be safe to proceed with further myelosuppressive chemo. Not a candidate for further RT. Plan to continue with PO Cytoxan, Keytruda, and IV ABX.   11/10 (f/u): Pain is mostly controlled and appetite is improving. Biggest complaint is fatigue. Also reports that she was told that she may need a hemicolectomy for a fistula between her small bowel and spine causing her known abscess, and need to be on TPN. She is going to speak to Dr. Anderson more about it next week. Very apprehensive about the surgery and needing to be on TPN but she was told she cannot get further myelosuppressive chemo without it due to the ongoing infection  11/20 (f/u): Now told that she does not need TPN/surgery and can continue with current treatment regimen. Was told to f/u Dr. Guardado as well and is pending making an apt there. Pain is controlled for the most part. She is frustrated by the constant changing of plans but relieved not to have to pursue TPN + surgery  1/8/24: Reports worsening leg pain, present in both but R>L. Feels "heavy." no numbness, no tingling, no sharp/shooting. Stretching helps. Tylenol helps. Still only taking Dilaudid 2-3x/day. Tolerating treating well. Finding it harder to stay active- more fatigued and out of breath. Taking 1 Senna BID + Miralax in AM, having daily BMs but they are hard and she is straining.   RX: C/w Fentanyl patch @ 25mcg/hr- we discussed increasing to 37mcg - she wants to try adding in additional Dilaudid in the middle of the day, Increase Dilaudid to TID, Increase Senna to 1 tab in AM, 2 tabs at night, + Miralax QD  Two sons (Alexis, 29, and Brandon 27).  Alexis () 5 siblings Angela- CHARITO [facility]- [facility] [place]

## 2024-02-08 RX ORDER — HYDROMORPHONE HYDROCHLORIDE 4 MG/1
4 TABLET ORAL
Qty: 90 | Refills: 0 | Status: ACTIVE | COMMUNITY
Start: 2023-05-17 | End: 1900-01-01

## 2024-02-09 ENCOUNTER — APPOINTMENT (OUTPATIENT)
Dept: GERIATRICS | Facility: CLINIC | Age: 64
End: 2024-02-09
Payer: MEDICARE

## 2024-02-09 ENCOUNTER — APPOINTMENT (OUTPATIENT)
Dept: GYNECOLOGIC ONCOLOGY | Facility: CLINIC | Age: 64
End: 2024-02-09

## 2024-02-09 VITALS
SYSTOLIC BLOOD PRESSURE: 121 MMHG | RESPIRATION RATE: 16 BRPM | OXYGEN SATURATION: 100 % | BODY MASS INDEX: 17.51 KG/M2 | WEIGHT: 102 LBS | HEART RATE: 82 BPM | DIASTOLIC BLOOD PRESSURE: 82 MMHG

## 2024-02-09 DIAGNOSIS — Z71.89 OTHER SPECIFIED COUNSELING: ICD-10-CM

## 2024-02-09 DIAGNOSIS — M89.8X8 OTHER SPECIFIED DISORDERS OF BONE, OTHER SITE: ICD-10-CM

## 2024-02-09 DIAGNOSIS — K59.03 DRUG INDUCED CONSTIPATION: ICD-10-CM

## 2024-02-09 DIAGNOSIS — T40.2X5A DRUG INDUCED CONSTIPATION: ICD-10-CM

## 2024-02-09 PROCEDURE — 99495 TRANSJ CARE MGMT MOD F2F 14D: CPT

## 2024-02-09 NOTE — PHYSICAL EXAM
[General Appearance - Alert] : alert [Oriented To Time, Place, And Person] : oriented to person, place, and time [Affect] : the affect was normal [Mood] : the mood was normal [No Acute Distress] : no acute distress [No Respiratory Distress] : no respiratory distress  [Soft] : abdomen soft [Normal] : affect was normal and insight and judgment were intact [de-identified] : Cachetic, muscle wasting [de-identified] : 3+ b/l LE edema, unwrapped [37440 - High Complexity requires an extensive number of possible diagnoses and/or the management options, extensive complexity of the medical data (tests, etc.) to be reviewed, and a high risk of significant complications, morbidity, and/or mortality as w] : High Complexity

## 2024-02-09 NOTE — HISTORY OF PRESENT ILLNESS
[Home] : at home, [unfilled] , at the time of the visit. [Medical Office: ([facility])___] : at the medical office located in  [Verbal consent obtained from patient] : the patient, [unfilled] [Post-hospitalization from ___ Hospital] : Post-hospitalization from [unfilled] Hospital [Admitted on: ___] : The patient was admitted on [unfilled] [Discharged on ___] : discharged on [unfilled] [Discharge Summary] : discharge summary [Pertinent Labs] : pertinent labs [Radiology Findings] : radiology findings [Discharge Med List] : discharge medication list [Med Reconciliation] : medication reconciliation has been completed [Patient Contacted By: ____] : and contacted by [unfilled] [FreeTextEntry2] : Pt presented to [facility] on 1/25 s/p mechanical fall off of a curb leaving an MD apt. CT maxillofacial and head were negative for acute pathology. Labs notable for Hgb 6.8, and she underwent transfusion with 2U PRBCs. She also fell in ER bathroom. ERquired total of 14 sutures on 3 separate lacerations. She was admitted to [facility] and discharged the following day.

## 2024-03-07 ENCOUNTER — APPOINTMENT (OUTPATIENT)
Dept: GERIATRICS | Facility: CLINIC | Age: 64
End: 2024-03-07
Payer: MEDICARE

## 2024-03-07 DIAGNOSIS — C56.9 MALIGNANT NEOPLASM OF UNSPECIFIED OVARY: ICD-10-CM

## 2024-03-07 DIAGNOSIS — Z51.5 ENCOUNTER FOR PALLIATIVE CARE: ICD-10-CM

## 2024-03-07 DIAGNOSIS — G89.3 NEOPLASM RELATED PAIN (ACUTE) (CHRONIC): ICD-10-CM

## 2024-03-07 DIAGNOSIS — R64 CACHEXIA: ICD-10-CM

## 2024-03-07 DIAGNOSIS — R45.89 OTHER SYMPTOMS AND SIGNS INVOLVING EMOTIONAL STATE: ICD-10-CM

## 2024-03-07 PROCEDURE — 99214 OFFICE O/P EST MOD 30 MIN: CPT

## 2024-03-07 RX ORDER — FENTANYL 25 UG/H
25 PATCH, EXTENDED RELEASE TRANSDERMAL
Qty: 10 | Refills: 0 | Status: ACTIVE | COMMUNITY
Start: 2023-05-17 | End: 1900-01-01

## 2024-03-11 ENCOUNTER — TRANSCRIPTION ENCOUNTER (OUTPATIENT)
Age: 64
End: 2024-03-11

## 2024-03-13 ENCOUNTER — RESULT REVIEW (OUTPATIENT)
Age: 64
End: 2024-03-13

## 2024-03-14 ENCOUNTER — RESULT REVIEW (OUTPATIENT)
Age: 64
End: 2024-03-14

## 2024-03-17 ENCOUNTER — TRANSCRIPTION ENCOUNTER (OUTPATIENT)
Age: 64
End: 2024-03-17

## 2024-03-21 ENCOUNTER — TRANSCRIPTION ENCOUNTER (OUTPATIENT)
Age: 64
End: 2024-03-21

## 2024-03-23 PROBLEM — R64 CANCER CACHEXIA: Status: ACTIVE | Noted: 2023-08-23

## 2024-03-23 PROBLEM — R45.89 ANXIETY ABOUT HEALTH: Status: ACTIVE | Noted: 2023-08-23

## 2024-03-23 NOTE — ASSESSMENT
[Frailty-weight loss of >5%] : frailty-weight loss  [Social support] : social support [Fall precautions] : fall precautions [Advanced Directives] : advanced directives [Living arrangements] : living arrangements [Medication Management] : medication management [Pain Management] : pain management [FreeTextEntry1] : renewed fentanyl patch 25 mcg  continue hydromorphone 4 mg q4h prn recheck TSH--pt being followed by endocrinologist f/u with Dr. Anderson for labs  f/u in one month for labs  Dr. Gallardo ID, has been on abx since 6/2023 Ertapenem daily  does not use walker  interested in chair lift and shower chair  PT to home for increased mobility  PC SW to look into wheelchair transport light  ISTOP reviewed.   f/u one month or prn as needed

## 2024-03-23 NOTE — REASON FOR VISIT
[Home] : at home, [unfilled] , at the time of the visit. [Medical Office: ([facility])___] : at the medical office located in  [Self] : self [Patient] : the patient

## 2024-03-23 NOTE — HISTORY OF PRESENT ILLNESS
[FreeTextEntry1] : pt reports increased fatigue, reports increased difficulty walking due to fatigue worse after chemo, reports increased sleepiness, pain in abdomen, controlled with Fentanyl 25 mcg  takes hydromorphone TID  +constipation takes senna  reports has been tolerating treatments although feels more tired, pt reports she understands she has progression of disease wants to continue treatments as tolerated, currently on long term IV abx for chronic infection  not ready for hospice

## 2024-03-23 NOTE — PHYSICAL EXAM
[Oriented To Time, Place, And Person] : oriented to person, place, and time [Impaired Insight] : insight and judgment were intact [Affect] : the affect was normal [General Appearance - Alert] : alert [General Appearance - In No Acute Distress] : in no acute distress [Sclera] : the sclera and conjunctiva were normal [No Oral Pallor] : no oral pallor [Normal Oral Mucosa] : normal oral mucosa [Hearing Threshold Finger Rub Not [place]] : hearing was normal [Outer Ear] : the ears and nose were normal in appearance [] : no respiratory distress

## 2024-03-23 NOTE — REVIEW OF SYSTEMS
[Feeling Poorly] : feeling poorly [Feeling Tired] : feeling tired [Recent Weight Loss (___ Lbs)] : recent [unfilled] ~Ulb weight loss [As Noted in HPI] : as noted in HPI [Negative] : Gastrointestinal [FreeTextEntry2] : as noted in HPI, all other ROS negative